# Patient Record
Sex: MALE | Race: BLACK OR AFRICAN AMERICAN | NOT HISPANIC OR LATINO
[De-identification: names, ages, dates, MRNs, and addresses within clinical notes are randomized per-mention and may not be internally consistent; named-entity substitution may affect disease eponyms.]

---

## 2018-01-01 ENCOUNTER — RESULT REVIEW (OUTPATIENT)
Age: 58
End: 2018-01-01

## 2018-01-01 ENCOUNTER — APPOINTMENT (OUTPATIENT)
Dept: HEMATOLOGY ONCOLOGY | Facility: CLINIC | Age: 58
End: 2018-01-01
Payer: COMMERCIAL

## 2018-01-01 ENCOUNTER — OTHER (OUTPATIENT)
Age: 58
End: 2018-01-01

## 2018-01-01 ENCOUNTER — APPOINTMENT (OUTPATIENT)
Dept: HEMATOLOGY ONCOLOGY | Facility: CLINIC | Age: 58
End: 2018-01-01

## 2018-01-01 ENCOUNTER — INBOUND DOCUMENT (OUTPATIENT)
Age: 58
End: 2018-01-01

## 2018-01-01 ENCOUNTER — TRANSCRIPTION ENCOUNTER (OUTPATIENT)
Age: 58
End: 2018-01-01

## 2018-01-01 VITALS
TEMPERATURE: 99.1 F | SYSTOLIC BLOOD PRESSURE: 124 MMHG | RESPIRATION RATE: 20 BRPM | OXYGEN SATURATION: 99 % | BODY MASS INDEX: 31.66 KG/M2 | HEART RATE: 139 BPM | DIASTOLIC BLOOD PRESSURE: 83 MMHG | WEIGHT: 248 LBS

## 2018-01-01 VITALS
HEART RATE: 132 BPM | RESPIRATION RATE: 16 BRPM | TEMPERATURE: 99 F | DIASTOLIC BLOOD PRESSURE: 71 MMHG | HEIGHT: 74.21 IN | BODY MASS INDEX: 30.16 KG/M2 | WEIGHT: 234.99 LBS | OXYGEN SATURATION: 99 % | SYSTOLIC BLOOD PRESSURE: 100 MMHG

## 2018-01-01 VITALS
HEART RATE: 130 BPM | SYSTOLIC BLOOD PRESSURE: 97 MMHG | HEIGHT: 74.21 IN | TEMPERATURE: 97.7 F | BODY MASS INDEX: 30.93 KG/M2 | RESPIRATION RATE: 20 BRPM | WEIGHT: 241 LBS | DIASTOLIC BLOOD PRESSURE: 69 MMHG | OXYGEN SATURATION: 98 %

## 2018-01-01 VITALS
HEIGHT: 73.43 IN | WEIGHT: 281 LBS | TEMPERATURE: 97.8 F | DIASTOLIC BLOOD PRESSURE: 80 MMHG | BODY MASS INDEX: 36.45 KG/M2 | HEART RATE: 99 BPM | SYSTOLIC BLOOD PRESSURE: 116 MMHG | RESPIRATION RATE: 20 BRPM | OXYGEN SATURATION: 99 %

## 2018-01-01 VITALS
HEART RATE: 124 BPM | BODY MASS INDEX: 32.85 KG/M2 | HEIGHT: 74.21 IN | RESPIRATION RATE: 22 BRPM | TEMPERATURE: 98.6 F | SYSTOLIC BLOOD PRESSURE: 106 MMHG | OXYGEN SATURATION: 100 % | WEIGHT: 256 LBS | DIASTOLIC BLOOD PRESSURE: 76 MMHG

## 2018-01-01 VITALS
SYSTOLIC BLOOD PRESSURE: 89 MMHG | WEIGHT: 239 LBS | HEART RATE: 133 BPM | DIASTOLIC BLOOD PRESSURE: 68 MMHG | TEMPERATURE: 97.4 F | RESPIRATION RATE: 22 BRPM | OXYGEN SATURATION: 99 % | BODY MASS INDEX: 30.51 KG/M2

## 2018-01-01 VITALS
BODY MASS INDEX: 34.91 KG/M2 | DIASTOLIC BLOOD PRESSURE: 74 MMHG | SYSTOLIC BLOOD PRESSURE: 108 MMHG | HEART RATE: 117 BPM | OXYGEN SATURATION: 100 % | TEMPERATURE: 97.9 F | WEIGHT: 272 LBS | HEIGHT: 74.21 IN | RESPIRATION RATE: 20 BRPM

## 2018-01-01 DIAGNOSIS — Z87.898 PERSONAL HISTORY OF OTHER SPECIFIED CONDITIONS: ICD-10-CM

## 2018-01-01 DIAGNOSIS — Z87.891 PERSONAL HISTORY OF NICOTINE DEPENDENCE: ICD-10-CM

## 2018-01-01 DIAGNOSIS — Z80.0 FAMILY HISTORY OF MALIGNANT NEOPLASM OF DIGESTIVE ORGANS: ICD-10-CM

## 2018-01-01 DIAGNOSIS — R68.83 CHILLS (WITHOUT FEVER): ICD-10-CM

## 2018-01-01 PROCEDURE — 99215 OFFICE O/P EST HI 40 MIN: CPT

## 2018-01-01 PROCEDURE — 99215 OFFICE O/P EST HI 40 MIN: CPT | Mod: Q5

## 2018-01-01 PROCEDURE — 99214 OFFICE O/P EST MOD 30 MIN: CPT

## 2018-01-01 RX ORDER — DRONABINOL 10 MG/1
10 CAPSULE ORAL TWICE DAILY
Qty: 60 | Refills: 0 | Status: DISCONTINUED | COMMUNITY
Start: 2018-01-01 | End: 2018-01-01

## 2018-01-01 RX ORDER — BINIMETINIB 15 MG/1
15 TABLET, FILM COATED ORAL
Qty: 180 | Refills: 10 | Status: ACTIVE | COMMUNITY
Start: 2018-01-01 | End: 1900-01-01

## 2018-01-01 RX ORDER — PANTOPRAZOLE 40 MG/1
40 TABLET, DELAYED RELEASE ORAL DAILY
Qty: 30 | Refills: 2 | Status: ACTIVE | COMMUNITY
Start: 2018-01-01 | End: 1900-01-01

## 2018-01-01 RX ORDER — CIPROFLOXACIN HYDROCHLORIDE 500 MG/1
500 TABLET, FILM COATED ORAL
Qty: 14 | Refills: 0 | Status: DISCONTINUED | COMMUNITY
Start: 2018-01-01 | End: 2018-01-01

## 2018-01-01 RX ORDER — MIRTAZAPINE 45 MG/1
45 TABLET, FILM COATED ORAL
Qty: 30 | Refills: 6 | Status: ACTIVE | COMMUNITY
Start: 2018-01-01 | End: 1900-01-01

## 2018-01-01 RX ORDER — LIDOCAINE AND PRILOCAINE 25; 25 MG/G; MG/G
2.5-2.5 CREAM TOPICAL
Qty: 1 | Refills: 6 | Status: ACTIVE | COMMUNITY
Start: 2018-01-01 | End: 1900-01-01

## 2018-01-01 RX ORDER — OXYCODONE HYDROCHLORIDE 5 MG/1
5 TABLET ORAL
Refills: 0 | Status: DISCONTINUED | COMMUNITY
End: 2018-01-01

## 2018-01-01 RX ORDER — MIRTAZAPINE 45 MG/1
45 TABLET, FILM COATED ORAL
Qty: 30 | Refills: 6 | Status: DISCONTINUED | COMMUNITY
Start: 2018-01-01 | End: 2018-01-01

## 2018-01-01 RX ORDER — ONDANSETRON 4 MG/1
4 TABLET, ORALLY DISINTEGRATING ORAL
Qty: 30 | Refills: 3 | Status: ACTIVE | COMMUNITY
Start: 2018-01-01 | End: 1900-01-01

## 2018-01-01 RX ORDER — LORATADINE 5 MG
17 TABLET,CHEWABLE ORAL
Refills: 0 | Status: ACTIVE | COMMUNITY

## 2018-01-01 RX ORDER — L.ACIDOPH/L.BULG/B.BIF/S.THERM 1B-250 MG
TABLET ORAL
Refills: 0 | Status: DISCONTINUED | COMMUNITY
End: 2018-01-01

## 2018-01-01 RX ORDER — DRONABINOL 5 MG/1
5 CAPSULE ORAL
Refills: 0 | Status: DISCONTINUED | COMMUNITY
End: 2018-01-01

## 2018-10-09 PROBLEM — Z00.00 ENCOUNTER FOR PREVENTIVE HEALTH EXAMINATION: Status: ACTIVE | Noted: 2018-01-01

## 2018-10-17 PROBLEM — Z80.0 FAMILY HISTORY OF PANCREATIC CANCER: Status: ACTIVE | Noted: 2018-01-01

## 2018-10-18 PROBLEM — Z87.891 FORMER SMOKER: Status: ACTIVE | Noted: 2018-01-01

## 2018-10-18 PROBLEM — Z87.898 HISTORY OF MORBID OBESITY: Status: RESOLVED | Noted: 2018-01-01 | Resolved: 2018-01-01

## 2018-11-05 NOTE — PHYSICAL EXAM
[Ambulatory and capable of all self care but unable to carry out any work activities] : Status 2- Ambulatory and capable of all self care but unable to carry out any work activities. Up and about more than 50% of waking hours [Obese] : obese [Normal] : full range of motion and no deformities appreciated

## 2018-11-12 NOTE — ASSESSMENT
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\par Aggressive high grade malignancy with extensive tissue necrosis\par WOrsening performance status, with no appetite and severe weight loss\par SIMRAN\par \par For C2D1 Cisplatin gemcitabine\par Remeron for depression and appetite\par Ritalin offered- he wants to continue with remeron for a while\par \par Plan:\par Cisplatin (25 mg/m2) + gemcitabine (1000 mg/m2) on days 1 and 8 every 21 days  \par \par Depression, failure to thrive\par On marinol\par Add remeron 45 mg QHS\par \par Family ho pancreatic cancer\par Father and 2 brothers apparently  of pancreatic cancer\par Genetic testing done - results pending\par \par Follow up in 1 week for C2D1 cis gem. CBC, CMP,

## 2018-11-12 NOTE — HISTORY OF PRESENT ILLNESS
[de-identified] : Mr Rios is a very pleasant 58 year old gentleman seen in the office to follow up regarding his new diagnosis of gall bladder adenosquamous carcinoma. \par \par He was seen and followed up during his recent inpatient hospitalizations at [facility]\par \par He presented 9/26/18 to [facility] with jaundice\par \par He reports trying to lose weight - had some detox juice few days ago. Since then his urine turned dark. His family and work place colleagues noticed that he had yellow eyes and asked him to be evaluated\par \par He reports losing 5-6 lbs over the past 2 weeks\par Does not have an appetite\par No abdominal pain\par \par Labs showed T Sudeep of 14 and CT showed Findings most compatible with an infiltrating gallbladder neoplasm with hepatic metastases and mildly enlarged portal lymph nodes.  Diffusely irregular gallbladder contour with mild wall thickening and surrounding infiltrative changes.  The possibility of superimposed contained perforation and/or acute cholecystitis is not excluded. Intrahepatic biliary dilatation which may be related to mass effect or possibly ductal invasion.  Loss of the fat plane between the gallbladder and the proximal transverse colon.  This segment of bowel appear somewhat thickened.  Local invasion cannot be excluded. Indeterminate bilateral renal lesions.\par \par He underwent EGD and ERCP (9/28/18) which showed Bile duct mass causing proximal biliary and intrahepatic obstruction s/p biopsy - not amenable to bilateral endoscopic stent placement due to complete obstruction of the left intrahepatic system \par Path was non diagnostic with necrotic tissue\par \par On the same day he underwent  internalization with bilateral covered stents and placed bilateral internal/external drains 8.5 FR by IR \par He subsequently developed fevers s/p antibiotics. He had drain care, antibiotics and pain control. \par He had a CEA of 3.7 and CA 19-9 of 306\par \par He accidentally pulled out his drain in his sleep and presented to [facility] again on 10/11/18 w/ bleeding at tube site - possibly from suture site\par Image guided biopsy by IR again showed necrotic tissue\par He underwent right external biliary catheter check and upsize to 10-Latvian. Patent CBD stents with extensive intraluminal blood clots and US-guided core biopsy of right liver lesion by IR\par \par Bleeding stopped and he was discharged pending pathology\par \par Pathology \par ADENOSQUAMOUS CARCINOMA, HIGH GRADE, WITH EXTENSIVE NECROSIS.\par TUMOR IMMUNOHISTOCHEMICAL STAINS:\par    POSITIVE: P40, CK 7 AND CK19.\par    NEGATIVE: CK20, HEPATOCYTE AND ARGINASE-1.\par COMMENT:  THIS IMMUNOPHENOTYPE SUPPORTS THE DIAGNOSIS OF ADENOSQUAMOUS CARCINOMA AND IS CONSISTENT WITH A PRIMARY PANCREATICOBILIARY TUMOR (INCLUDING GALLBLADDER).  [de-identified] : He is seen today for C2D1 cisplatin gemcitabine\par \par Feels better,\par has slightly improved appetite\par Has started trying to eat more. \par \par No pain\par \par Takes remeron daily\par \par Empties the biliary drain bag once to twice daily\par \par Weight: 281 lbs -> 272 lbs -> 268 lbs -> 256 lbs\par \par

## 2018-11-12 NOTE — CONSULT LETTER
[Dear  ___] : Dear  [unfilled], [Courtesy Letter:] : I had the pleasure of seeing your patient, [unfilled], in my office today. [Please see my note below.] : Please see my note below. [Consult Closing:] : Thank you very much for allowing me to participate in the care of this patient.  If you have any questions, please do not hesitate to contact me. [Sincerely,] : Sincerely, [FreeTextEntry3] : Jorge Ruiz MD, MPH\par Attending Physician\par Hematology Oncology\par [facility] [place]\par [facility]\par

## 2018-11-19 NOTE — CONSULT LETTER
[Dear  ___] : Dear  [unfilled], [Courtesy Letter:] : I had the pleasure of seeing your patient, [unfilled], in my office today. [Please see my note below.] : Please see my note below. [Consult Closing:] : Thank you very much for allowing me to participate in the care of this patient.  If you have any questions, please do not hesitate to contact me. [Sincerely,] : Sincerely, [FreeTextEntry3] : Jorge Ruiz MD, MPH\par Attending Physician\par Hematology Oncology\par [facility] [place]\par [facility]\par

## 2018-11-19 NOTE — ASSESSMENT
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\par Aggressive high grade malignancy with extensive tissue necrosis\par WOrsening performance status, with no appetite and severe weight loss\par SIMRAN\par \par For C2D8 Cisplatin gemcitabine\par Remeron for depression and appetite\par Ritalin offered- he wants to continue with remeron for a while\par \par Plan:\par Cisplatin (25 mg/m2) + gemcitabine (1000 mg/m2) on days 1 and 8 every 21 days  \par \par Depression, failure to thrive\par On marinol- increase to 10 mg BID\par Remeron 45 mg QHS\par \par Family ho pancreatic cancer\par Father and 2 brothers apparently  of pancreatic cancer\par Genetic testing done - results pending\par \par Horse voice\par Refer to ENT\par Start pantoprazole\par \par Follow up in 2 weeks for C3D1 cis gem. CBC, CMP,

## 2018-11-19 NOTE — HISTORY OF PRESENT ILLNESS
[de-identified] : Mr Rios is a very pleasant 58 year old gentleman seen in the office to follow up regarding his new diagnosis of gall bladder adenosquamous carcinoma. \par \par He was seen and followed up during his recent inpatient hospitalizations at [facility]\par \par He presented 9/26/18 to [facility] with jaundice\par \par He reports trying to lose weight - had some detox juice few days ago. Since then his urine turned dark. His family and work place colleagues noticed that he had yellow eyes and asked him to be evaluated\par \par He reports losing 5-6 lbs over the past 2 weeks\par Does not have an appetite\par No abdominal pain\par \par Labs showed T Sudeep of 14 and CT showed Findings most compatible with an infiltrating gallbladder neoplasm with hepatic metastases and mildly enlarged portal lymph nodes.  Diffusely irregular gallbladder contour with mild wall thickening and surrounding infiltrative changes.  The possibility of superimposed contained perforation and/or acute cholecystitis is not excluded. Intrahepatic biliary dilatation which may be related to mass effect or possibly ductal invasion.  Loss of the fat plane between the gallbladder and the proximal transverse colon.  This segment of bowel appear somewhat thickened.  Local invasion cannot be excluded. Indeterminate bilateral renal lesions.\par \par He underwent EGD and ERCP (9/28/18) which showed Bile duct mass causing proximal biliary and intrahepatic obstruction s/p biopsy - not amenable to bilateral endoscopic stent placement due to complete obstruction of the left intrahepatic system \par Path was non diagnostic with necrotic tissue\par \par On the same day he underwent  internalization with bilateral covered stents and placed bilateral internal/external drains 8.5 FR by IR \par He subsequently developed fevers s/p antibiotics. He had drain care, antibiotics and pain control. \par He had a CEA of 3.7 and CA 19-9 of 306\par \par He accidentally pulled out his drain in his sleep and presented to [facility] again on 10/11/18 w/ bleeding at tube site - possibly from suture site\par Image guided biopsy by IR again showed necrotic tissue\par He underwent right external biliary catheter check and upsize to 10-Canadian. Patent CBD stents with extensive intraluminal blood clots and US-guided core biopsy of right liver lesion by IR\par \par Bleeding stopped and he was discharged pending pathology\par \par Pathology \par ADENOSQUAMOUS CARCINOMA, HIGH GRADE, WITH EXTENSIVE NECROSIS.\par TUMOR IMMUNOHISTOCHEMICAL STAINS:\par    POSITIVE: P40, CK 7 AND CK19.\par    NEGATIVE: CK20, HEPATOCYTE AND ARGINASE-1.\par COMMENT:  THIS IMMUNOPHENOTYPE SUPPORTS THE DIAGNOSIS OF ADENOSQUAMOUS CARCINOMA AND IS CONSISTENT WITH A PRIMARY PANCREATICOBILIARY TUMOR (INCLUDING GALLBLADDER).  [de-identified] : He is seen today for C2D8 gemcitabine of the cisplatin gemcitabine regimen\par \par Feels better,\par Has slightly improved appetite\par Has started trying to eat more. \par \par No pain\par \par Takes remeron daily\par \par Empties the biliary drain bag once to twice daily\par \par Weight: 281 lbs -> 272 lbs -> 268 lbs -> 256 lbs ->248 lbs\par \par

## 2018-12-03 PROBLEM — R68.83 CHILLS: Status: ACTIVE | Noted: 2018-01-01

## 2018-12-03 NOTE — HISTORY OF PRESENT ILLNESS
[de-identified] : Mr Rios is a very pleasant 58 year old gentleman seen in the office to follow up regarding his new diagnosis of gall bladder adenosquamous carcinoma. \par \par He was seen and followed up during his recent inpatient hospitalizations at [facility]\par \par He presented 9/26/18 to [facility] with jaundice\par \par He reports trying to lose weight - had some detox juice few days ago. Since then his urine turned dark. His family and work place colleagues noticed that he had yellow eyes and asked him to be evaluated\par \par He reports losing 5-6 lbs over the past 2 weeks\par Does not have an appetite\par No abdominal pain\par \par Labs showed T Sudeep of 14 and CT showed Findings most compatible with an infiltrating gallbladder neoplasm with hepatic metastases and mildly enlarged portal lymph nodes.  Diffusely irregular gallbladder contour with mild wall thickening and surrounding infiltrative changes.  The possibility of superimposed contained perforation and/or acute cholecystitis is not excluded. Intrahepatic biliary dilatation which may be related to mass effect or possibly ductal invasion.  Loss of the fat plane between the gallbladder and the proximal transverse colon.  This segment of bowel appear somewhat thickened.  Local invasion cannot be excluded. Indeterminate bilateral renal lesions.\par \par He underwent EGD and ERCP (9/28/18) which showed Bile duct mass causing proximal biliary and intrahepatic obstruction s/p biopsy - not amenable to bilateral endoscopic stent placement due to complete obstruction of the left intrahepatic system \par Path was non diagnostic with necrotic tissue\par \par On the same day he underwent  internalization with bilateral covered stents and placed bilateral internal/external drains 8.5 FR by IR \par He subsequently developed fevers s/p antibiotics. He had drain care, antibiotics and pain control. \par He had a CEA of 3.7 and CA 19-9 of 306\par \par He accidentally pulled out his drain in his sleep and presented to [facility] again on 10/11/18 w/ bleeding at tube site - possibly from suture site\par Image guided biopsy by IR again showed necrotic tissue\par He underwent right external biliary catheter check and upsize to 10-Macedonian. Patent CBD stents with extensive intraluminal blood clots and US-guided core biopsy of right liver lesion by IR\par \par Bleeding stopped and he was discharged pending pathology\par \par Pathology \par ADENOSQUAMOUS CARCINOMA, HIGH GRADE, WITH EXTENSIVE NECROSIS.\par TUMOR IMMUNOHISTOCHEMICAL STAINS:\par    POSITIVE: P40, CK 7 AND CK19.\par    NEGATIVE: CK20, HEPATOCYTE AND ARGINASE-1.\par COMMENT:  THIS IMMUNOPHENOTYPE SUPPORTS THE DIAGNOSIS OF ADENOSQUAMOUS CARCINOMA AND IS CONSISTENT WITH A PRIMARY PANCREATICOBILIARY TUMOR (INCLUDING GALLBLADDER).  [de-identified] : He is seen today for C3D1 cisplatin gemcitabine regimen\par \par Status unchanged\par Per wife- he had some chills and sweats\par Has been eating small portions mutliple times\par \par No pain\par \par Takes remeron daily\par \par Empties the biliary drain bag once to twice daily. Drainage has been more foul smelling lately\par \par Also has chills, night sweats. They do not check his temp\par \par Took pantoprazole x 15 days without improvement in his voice\par \par \par Weight: 281 lbs -> 272 lbs -> 268 lbs -> 256 lbs ->248 lbs -> 239 lbs\par \par

## 2018-12-03 NOTE — ASSESSMENT
[FreeTextEntry1] : Hypotension, tachycardia\par Possible dehydration vs infection/sepsis\par Advised to go to ED, HE declines\par Plan to give 1L NS in the infusion center. 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 benefit with BRAF inhibitor as he continues to decline- Will try to arrange on a arabella basis if any clinical benefit\par Scan after C3\par \par Plan:\par Cisplatin (25 mg/m2) + gemcitabine (1000 mg/m2) on days 1 and 8 every 21 days  \par \par Depression, failure to thrive\par On marinol- increase to 10 mg BID\par Remeron 45 mg QHS\par \par Family ho pancreatic cancer\par Father and 2 brothers apparently  of pancreatic cancer\par Genetic testing done - VUS found- reports scanned\par Refer for genetic testing\par \par Horse voice\par Refer to ENT\par No benefit with pantoprazole\par \par Follow up in 1 week for C3D8 cis gem. CBC, CMP

## 2018-12-03 NOTE — CONSULT LETTER
[Dear  ___] : Dear  [unfilled], [Courtesy Letter:] : I had the pleasure of seeing your patient, [unfilled], in my office today. [Please see my note below.] : Please see my note below. [Consult Closing:] : Thank you very much for allowing me to participate in the care of this patient.  If you have any questions, please do not hesitate to contact me. [Sincerely,] : Sincerely, [FreeTextEntry3] : Jorge Ruiz MD, MPH\par Attending Physician\par Hematology Oncology\par [facility] [place]\[facility]\par

## 2018-12-10 NOTE — HISTORY OF PRESENT ILLNESS
[de-identified] : Mr Rios is a very pleasant 58 year old gentleman seen in the office to follow up regarding his new diagnosis of gall bladder adenosquamous carcinoma. \par \par He was seen and followed up during his recent inpatient hospitalizations at [facility]\par \par He presented 9/26/18 to [facility] with jaundice\par \par He reports trying to lose weight - had some detox juice few days ago. Since then his urine turned dark. His family and work place colleagues noticed that he had yellow eyes and asked him to be evaluated\par \par He reports losing 5-6 lbs over the past 2 weeks\par Does not have an appetite\par No abdominal pain\par \par Labs showed T Sudeep of 14 and CT showed Findings most compatible with an infiltrating gallbladder neoplasm with hepatic metastases and mildly enlarged portal lymph nodes.  Diffusely irregular gallbladder contour with mild wall thickening and surrounding infiltrative changes.  The possibility of superimposed contained perforation and/or acute cholecystitis is not excluded. Intrahepatic biliary dilatation which may be related to mass effect or possibly ductal invasion.  Loss of the fat plane between the gallbladder and the proximal transverse colon.  This segment of bowel appear somewhat thickened.  Local invasion cannot be excluded. Indeterminate bilateral renal lesions.\par \par He underwent EGD and ERCP (9/28/18) which showed Bile duct mass causing proximal biliary and intrahepatic obstruction s/p biopsy - not amenable to bilateral endoscopic stent placement due to complete obstruction of the left intrahepatic system \par Path was non diagnostic with necrotic tissue\par \par On the same day he underwent  internalization with bilateral covered stents and placed bilateral internal/external drains 8.5 FR by IR \par He subsequently developed fevers s/p antibiotics. He had drain care, antibiotics and pain control. \par He had a CEA of 3.7 and CA 19-9 of 306\par \par He accidentally pulled out his drain in his sleep and presented to [facility] again on 10/11/18 w/ bleeding at tube site - possibly from suture site\par Image guided biopsy by IR again showed necrotic tissue\par He underwent right external biliary catheter check and upsize to 10-[place] Leonean. Patent CBD stents with extensive intraluminal blood clots and US-guided core biopsy of right liver lesion by IR\par \par Bleeding stopped and he was discharged pending pathology\par \par Pathology \par ADENOSQUAMOUS CARCINOMA, HIGH GRADE, WITH EXTENSIVE NECROSIS.\par TUMOR IMMUNOHISTOCHEMICAL STAINS:\par    POSITIVE: P40, CK 7 AND CK19.\par    NEGATIVE: CK20, HEPATOCYTE AND ARGINASE-1.\par COMMENT:  THIS IMMUNOPHENOTYPE SUPPORTS THE DIAGNOSIS OF ADENOSQUAMOUS CARCINOMA AND IS CONSISTENT WITH A PRIMARY PANCREATICOBILIARY TUMOR (INCLUDING GALLBLADDER).  [de-identified] : He is seen today for C3D8 cisplatin gemcitabine regimen\par \par He reports feeling the same but not declining\par There are times when he feels good in between not so good times\par \par Has been eating small portions mutliple times\par \par No pain\par \par Takes remeron daily\par \par Empties the biliary drain bag 4-5 times\par \par Continues to have change in his voice. Has not seen ENT as yet\par \par Weight: 281 lbs -> 272 lbs -> 268 lbs -> 256 lbs ->248 lbs -> 239 lbs -> 241 lbs\par \par

## 2018-12-10 NOTE — CONSULT LETTER
[Dear  ___] : Dear  [unfilled], [Courtesy Letter:] : I had the pleasure of seeing your patient, [unfilled], in my office today. [Please see my note below.] : Please see my note below. [Consult Closing:] : Thank you very much for allowing me to participate in the care of this patient.  If you have any questions, please do not hesitate to contact me. [Sincerely,] : Sincerely, [FreeTextEntry3] : Jorge Ruiz MD, MPH\par Attending Physician\par Hematology Oncology\par [facility] [place]\par [facility]\par

## 2018-12-10 NOTE — ASSESSMENT
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 benefit with BRAF inhibitor as he continues to decline- Will try to arrange on a arabella basis if any clinical benefit\par Scan after C3\par \par Plan:\par Cisplatin (25 mg/m2) + gemcitabine (1000 mg/m2) on days 1 and 8 every 21 days  \par \par Depression, failure to thrive\par On marinol- increase to 10 mg BID\par Remeron 45 mg QHS\par \par Family ho pancreatic cancer\par Father and 2 brothers apparently  of pancreatic cancer\par Genetic testing done - VUS found- reports scanned\par Refer for genetic testing\par \par Horse voice\par Refer to ENT\par No benefit with pantoprazole\par \par Follow up in 2 weeks for C4D1 cis gem. CBC, CMP

## 2018-12-24 NOTE — ASSESSMENT
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 benefit with BRAF inhibitor - Will check with pharmacy if it was approved - likely rejected\par Scan after C3 show stable disease\par \par Plan:\par Cisplatin (25 mg/m2) + gemcitabine (1000 mg/m2) on days 1 and 8 every 21 days  \par \par Depression, failure to thrive\par On marinol- increase to 10 mg BID\par Remeron 45 mg QHS\par \par Tachycardia:\par Seen by cardiology during recent inpatient hospitalization\par Sinus tachy - advised to observe for now\par \par Family ho pancreatic cancer\par Father and 2 brothers apparently  of pancreatic cancer\par Genetic testing done - VUS found- reports scanned\par Follows with genetics\par \par Horse voice\par Per wife, his voice is baseline, gets worse after chemo (? mucositis)\par No benefit with pantoprazole\par \par Follow up in 1 weeks for C4D1 cis gem. CBC, CMP,

## 2018-12-24 NOTE — CONSULT LETTER
[Dear  ___] : Dear  [unfilled], [Courtesy Letter:] : I had the pleasure of seeing your patient, [unfilled], in my office today. [Please see my note below.] : Please see my note below. [Consult Closing:] : Thank you very much for allowing me to participate in the care of this patient.  If you have any questions, please do not hesitate to contact me. [Sincerely,] : Sincerely, [FreeTextEntry3] : Jorge Ruiz MD, MPH\par Attending Physician\par Hematology Oncology\par [facility] [place]\par [facility]\par

## 2018-12-24 NOTE — HISTORY OF PRESENT ILLNESS
[de-identified] : Mr Rios is a very pleasant 58 year old gentleman seen in the office to follow up regarding his new diagnosis of gall bladder adenosquamous carcinoma. \par \par He was seen and followed up during his recent inpatient hospitalizations at [facility]\par \par He presented 9/26/18 to [facility] with jaundice\par \par He reports trying to lose weight - had some detox juice few days ago. Since then his urine turned dark. His family and work place colleagues noticed that he had yellow eyes and asked him to be evaluated\par \par He reports losing 5-6 lbs over the past 2 weeks\par Does not have an appetite\par No abdominal pain\par \par Labs showed T Sudeep of 14 and CT showed Findings most compatible with an infiltrating gallbladder neoplasm with hepatic metastases and mildly enlarged portal lymph nodes.  Diffusely irregular gallbladder contour with mild wall thickening and surrounding infiltrative changes.  The possibility of superimposed contained perforation and/or acute cholecystitis is not excluded. Intrahepatic biliary dilatation which may be related to mass effect or possibly ductal invasion.  Loss of the fat plane between the gallbladder and the proximal transverse colon.  This segment of bowel appear somewhat thickened.  Local invasion cannot be excluded. Indeterminate bilateral renal lesions.\par \par He underwent EGD and ERCP (9/28/18) which showed Bile duct mass causing proximal biliary and intrahepatic obstruction s/p biopsy - not amenable to bilateral endoscopic stent placement due to complete obstruction of the left intrahepatic system \par Path was non diagnostic with necrotic tissue\par \par On the same day he underwent  internalization with bilateral covered stents and placed bilateral internal/external drains 8.5 FR by IR \par He subsequently developed fevers s/p antibiotics. He had drain care, antibiotics and pain control. \par He had a CEA of 3.7 and CA 19-9 of 306\par \par He accidentally pulled out his drain in his sleep and presented to [facility] again on 10/11/18 w/ bleeding at tube site - possibly from suture site\par Image guided biopsy by IR again showed necrotic tissue\par He underwent right external biliary catheter check and upsize to 10-[place]. Patent CBD stents with extensive intraluminal blood clots and US-guided core biopsy of right liver lesion by IR\par \par Bleeding stopped and he was discharged pending pathology\par \par Pathology \par ADENOSQUAMOUS CARCINOMA, HIGH GRADE, WITH EXTENSIVE NECROSIS.\par TUMOR IMMUNOHISTOCHEMICAL STAINS:\par    POSITIVE: P40, CK 7 AND CK19.\par    NEGATIVE: CK20, HEPATOCYTE AND ARGINASE-1.\par COMMENT:  THIS IMMUNOPHENOTYPE SUPPORTS THE DIAGNOSIS OF ADENOSQUAMOUS CARCINOMA AND IS CONSISTENT WITH A PRIMARY PANCREATICOBILIARY TUMOR (INCLUDING GALLBLADDER).  [de-identified] : He is seen today for C4D1 cisplatin gemcitabine regimen\par \par He was in the hospital with gram negative septicemia and dc on levaquin oral\par He still has ~5 more days of Abx\par Had a fever of 100.2F and jitteriness 2 days back\par None since\par \par Feels slightly better overall\par There are times when he feels good in between not so good times\par \par Has been eating small portions mutliple times\par \par No pain\par \par Takes remeron daily\par \par Empties the biliary drain bag 4-5 times\par \par Weight: 281 lbs -> 272 lbs -> 268 lbs -> 256 lbs ->248 lbs -> 239 lbs -> 241 lbs -> 248 lbs\par \par

## 2018-12-31 NOTE — RESULTS/DATA
[FreeTextEntry1] : 12/31/18:\par WBC 21.7\par HGB 12.3\par HCT 38.2\par ,000\par \par  199 (prev 183)\par

## 2018-12-31 NOTE — REVIEW OF SYSTEMS
[Fatigue] : fatigue [Recent Change In Weight] : ~T recent weight change [SOB on Exertion] : shortness of breath during exertion [Negative] : Allergic/Immunologic [Fever] : no fever [Eye Pain] : no eye pain [Vision Problems] : no vision problems [Dysphagia] : no dysphagia [Hoarseness] : no hoarseness [Mucosal Pain] : no mucosal pain [Chest Pain] : no chest pain [Lower Ext Edema] : no lower extremity edema [Shortness Of Breath] : no shortness of breath [Cough] : no cough [Constipation] : no constipation [Diarrhea] : no diarrhea [Dysuria] : no dysuria [Joint Pain] : no joint pain [Muscle Pain] : no muscle pain [Skin Rash] : no skin rash [Dizziness] : no dizziness [Anxiety] : no anxiety [Depression] : no depression [Hot Flashes] : no hot flashes [Easy Bleeding] : no tendency for easy bleeding [Easy Bruising] : no tendency for easy bruising [FreeTextEntry2] : 13 lb wt loss. [FreeTextEntry5] : tachycardia

## 2018-12-31 NOTE — ASSESSMENT
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 benefit with BRAF inhibitor - Will check with pharmacy if it was approved - likely rejected\par Scan after C3 show stable disease\par \par Plan:\par Cisplatin (25 mg/m2) + gemcitabine (1000 mg/m2) on days 1 and 8 every 21 days  \par \par Depression, failure to thrive\par fatigue secondary to liver involvement.\par Discontinued marinol secondary to sedation.\par Remeron 45 mg QHS\par \par Tachycardia:\par Seen by cardiology during recent inpatient hospitalization\par Sinus tachy - advised to observe for now\par \par Family ho pancreatic cancer\par Father and 2 brothers apparently  of pancreatic cancer\par 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 mucositis)\par No benefit with pantoprazole\par \par History of present illness, review of systems, physical exam and treatment plan reviewed with  covering forSanjeev. \par \par Follow up in 1 weeks for C4D8 cis gem. CBC, CMP,

## 2018-12-31 NOTE — HISTORY OF PRESENT ILLNESS
[Therapy: ___] : Therapy: [unfilled] [Cycle: ___] : Cycle: [unfilled] [Day: ___] : Day: [unfilled] [de-identified] : Mr Rios is a very pleasant 58 year old gentleman seen in the office to follow up regarding his new diagnosis of gall bladder adenosquamous carcinoma. \par \par He was seen and followed up during his recent inpatient hospitalizations at [facility]\par \par He presented 9/26/18 to [facility] with jaundice\par \par He reports trying to lose weight - had some detox juice few days ago. Since then his urine turned dark. His family and work place colleagues noticed that he had yellow eyes and asked him to be evaluated\par \par He reports losing 5-6 lbs over the past 2 weeks\par Does not have an appetite\par No abdominal pain\par \par Labs showed T Sudeep of 14 and CT showed Findings most compatible with an infiltrating gallbladder neoplasm with hepatic metastases and mildly enlarged portal lymph nodes.  Diffusely irregular gallbladder contour with mild wall thickening and surrounding infiltrative changes.  The possibility of superimposed contained perforation and/or acute cholecystitis is not excluded. Intrahepatic biliary dilatation which may be related to mass effect or possibly ductal invasion.  Loss of the fat plane between the gallbladder and the proximal transverse colon.  This segment of bowel appear somewhat thickened.  Local invasion cannot be excluded. Indeterminate bilateral renal lesions.\par \par He underwent EGD and ERCP (9/28/18) which showed Bile duct mass causing proximal biliary and intrahepatic obstruction s/p biopsy - not amenable to bilateral endoscopic stent placement due to complete obstruction of the left intrahepatic system \par Path was non diagnostic with necrotic tissue\par \par On the same day he underwent  internalization with bilateral covered stents and placed bilateral internal/external drains 8.5 FR by IR \par He subsequently developed fevers s/p antibiotics. He had drain care, antibiotics and pain control. \par He had a CEA of 3.7 and CA 19-9 of 306\par \par He accidentally pulled out his drain in his sleep and presented to [facility] again on 10/11/18 w/ bleeding at tube site - possibly from suture site\par Image guided biopsy by IR again showed necrotic tissue\par He underwent right external biliary catheter check and upsize to 10-[place]. Patent CBD stents with extensive intraluminal blood clots and US-guided core biopsy of right liver lesion by IR\par \par Bleeding stopped and he was discharged pending pathology\par \par Pathology \par ADENOSQUAMOUS CARCINOMA, HIGH GRADE, WITH EXTENSIVE NECROSIS.\par TUMOR IMMUNOHISTOCHEMICAL STAINS:\par    POSITIVE: P40, CK 7 AND CK19.\par    NEGATIVE: CK20, HEPATOCYTE AND ARGINASE-1.\par COMMENT:  THIS IMMUNOPHENOTYPE SUPPORTS THE DIAGNOSIS OF ADENOSQUAMOUS CARCINOMA AND IS CONSISTENT WITH A PRIMARY PANCREATICOBILIARY TUMOR (INCLUDING GALLBLADDER).  [FreeTextEntry1] : Cisplatin/Gemzar delayed c 4 day 1 [de-identified] : He is seen today for delayed C4D1 cisplatin gemcitabine regimen due to hospitalization for gram negative septicemia s/p levaquin oral\par He denies fever, but remains profoundly fatigued. he is eating,small frequent meals however, he continues to lose weight.    He discontinued Dronabinol secondary to intolerance of symptoms like fatigue.\par Empties the biliary drain bag 4-5 times\par \par Weight: 281 lbs -> 272 lbs -> 268 lbs -> 256 lbs ->248 lbs -> 239 lbs -> 241 lbs -> 248 lbs->235\par \par

## 2018-12-31 NOTE — PHYSICAL EXAM
[Ambulatory and capable of all self care but unable to carry out any work activities] : Status 2- Ambulatory and capable of all self care but unable to carry out any work activities. Up and about more than 50% of waking hours [Obese] : obese [Normal] : affect appropriate

## 2019-01-01 ENCOUNTER — RESULT REVIEW (OUTPATIENT)
Age: 59
End: 2019-01-01

## 2019-01-01 ENCOUNTER — APPOINTMENT (OUTPATIENT)
Dept: HEMATOLOGY ONCOLOGY | Facility: CLINIC | Age: 59
End: 2019-01-01
Payer: COMMERCIAL

## 2019-01-01 ENCOUNTER — APPOINTMENT (OUTPATIENT)
Dept: HEMATOLOGY ONCOLOGY | Facility: CLINIC | Age: 59
End: 2019-01-01

## 2019-01-01 ENCOUNTER — INBOUND DOCUMENT (OUTPATIENT)
Age: 59
End: 2019-01-01

## 2019-01-01 VITALS
HEART RATE: 149 BPM | BODY MASS INDEX: 29.77 KG/M2 | TEMPERATURE: 97.9 F | SYSTOLIC BLOOD PRESSURE: 77 MMHG | RESPIRATION RATE: 20 BRPM | WEIGHT: 232 LBS | OXYGEN SATURATION: 100 % | DIASTOLIC BLOOD PRESSURE: 56 MMHG | HEIGHT: 74.21 IN

## 2019-01-01 VITALS
DIASTOLIC BLOOD PRESSURE: 75 MMHG | SYSTOLIC BLOOD PRESSURE: 115 MMHG | WEIGHT: 251.99 LBS | TEMPERATURE: 97.6 F | HEART RATE: 129 BPM | RESPIRATION RATE: 20 BRPM | HEIGHT: 74.21 IN | OXYGEN SATURATION: 96 % | BODY MASS INDEX: 32.34 KG/M2

## 2019-01-01 VITALS
HEIGHT: 74.21 IN | RESPIRATION RATE: 20 BRPM | TEMPERATURE: 98.8 F | BODY MASS INDEX: 30.16 KG/M2 | OXYGEN SATURATION: 98 % | HEART RATE: 137 BPM | WEIGHT: 235 LBS | SYSTOLIC BLOOD PRESSURE: 107 MMHG | DIASTOLIC BLOOD PRESSURE: 74 MMHG

## 2019-01-01 VITALS
RESPIRATION RATE: 20 BRPM | WEIGHT: 233 LBS | TEMPERATURE: 98.3 F | DIASTOLIC BLOOD PRESSURE: 78 MMHG | HEART RATE: 127 BPM | BODY MASS INDEX: 29.9 KG/M2 | HEIGHT: 74.21 IN | OXYGEN SATURATION: 99 % | SYSTOLIC BLOOD PRESSURE: 111 MMHG

## 2019-01-01 VITALS
WEIGHT: 202 LBS | HEART RATE: 123 BPM | HEIGHT: 74.21 IN | BODY MASS INDEX: 25.93 KG/M2 | RESPIRATION RATE: 20 BRPM | TEMPERATURE: 97.5 F | SYSTOLIC BLOOD PRESSURE: 108 MMHG | OXYGEN SATURATION: 100 % | DIASTOLIC BLOOD PRESSURE: 77 MMHG

## 2019-01-01 VITALS
DIASTOLIC BLOOD PRESSURE: 68 MMHG | BODY MASS INDEX: 30.8 KG/M2 | SYSTOLIC BLOOD PRESSURE: 104 MMHG | HEIGHT: 74.21 IN | TEMPERATURE: 98.8 F | OXYGEN SATURATION: 100 % | WEIGHT: 240 LBS | RESPIRATION RATE: 22 BRPM | HEART RATE: 146 BPM

## 2019-01-01 VITALS
SYSTOLIC BLOOD PRESSURE: 121 MMHG | HEIGHT: 74.21 IN | TEMPERATURE: 98.8 F | RESPIRATION RATE: 20 BRPM | DIASTOLIC BLOOD PRESSURE: 81 MMHG | SYSTOLIC BLOOD PRESSURE: 88 MMHG | OXYGEN SATURATION: 100 % | HEART RATE: 119 BPM | OXYGEN SATURATION: 100 % | BODY MASS INDEX: 29.9 KG/M2 | TEMPERATURE: 98.9 F | RESPIRATION RATE: 22 BRPM | HEIGHT: 74.21 IN | DIASTOLIC BLOOD PRESSURE: 72 MMHG | WEIGHT: 232.98 LBS | HEART RATE: 127 BPM

## 2019-01-01 DIAGNOSIS — Z79.899 OTHER LONG TERM (CURRENT) DRUG THERAPY: ICD-10-CM

## 2019-01-01 DIAGNOSIS — Z85.05 PERSONAL HISTORY OF MALIGNANT NEOPLASM OF LIVER: ICD-10-CM

## 2019-01-01 DIAGNOSIS — R50.9 FEVER, UNSPECIFIED: ICD-10-CM

## 2019-01-01 DIAGNOSIS — R62.51 FAILURE TO THRIVE (CHILD): ICD-10-CM

## 2019-01-01 DIAGNOSIS — Z51.11 ENCOUNTER FOR ANTINEOPLASTIC CHEMOTHERAPY: ICD-10-CM

## 2019-01-01 DIAGNOSIS — Z15.09 GENETIC SUSCEPTIBILITY TO OTHER DISEASE: ICD-10-CM

## 2019-01-01 DIAGNOSIS — F32.9 MAJOR DEPRESSIVE DISORDER, SINGLE EPISODE, UNSPECIFIED: ICD-10-CM

## 2019-01-01 DIAGNOSIS — Z15.89 GENETIC SUSCEPTIBILITY TO OTHER DISEASE: ICD-10-CM

## 2019-01-01 DIAGNOSIS — R11.2 NAUSEA WITH VOMITING, UNSPECIFIED: ICD-10-CM

## 2019-01-01 DIAGNOSIS — C23 MALIGNANT NEOPLASM OF GALLBLADDER: ICD-10-CM

## 2019-01-01 DIAGNOSIS — R64 CACHEXIA: ICD-10-CM

## 2019-01-01 PROCEDURE — 99215 OFFICE O/P EST HI 40 MIN: CPT

## 2019-01-01 PROCEDURE — 99214 OFFICE O/P EST MOD 30 MIN: CPT

## 2019-01-01 RX ORDER — DEXAMETHASONE 4 MG/1
4 TABLET ORAL
Qty: 2 | Refills: 9 | Status: ACTIVE | COMMUNITY
Start: 2018-01-01 | End: 1900-01-01

## 2019-01-01 RX ORDER — ENCORAFENIB 50 MG/1
50 CAPSULE ORAL
Qty: 180 | Refills: 11 | Status: ACTIVE | COMMUNITY
Start: 2018-01-01 | End: 1900-01-01

## 2019-01-01 RX ORDER — METOCLOPRAMIDE 10 MG/1
10 TABLET ORAL EVERY 6 HOURS
Qty: 60 | Refills: 11 | Status: ACTIVE | COMMUNITY
Start: 2018-01-01 | End: 1900-01-01

## 2019-01-01 RX ORDER — ONDANSETRON 4 MG/1
4 TABLET, ORALLY DISINTEGRATING ORAL
Qty: 90 | Refills: 1 | Status: ACTIVE | COMMUNITY
Start: 2019-01-01 | End: 1900-01-01

## 2019-01-01 RX ORDER — AMOXICILLIN AND CLAVULANATE POTASSIUM 875; 125 MG/1; MG/1
875-125 TABLET, COATED ORAL
Qty: 14 | Refills: 0 | Status: DISCONTINUED | COMMUNITY
Start: 2019-01-01 | End: 2019-01-01

## 2019-01-01 RX ORDER — LEVOFLOXACIN 500 MG/1
500 TABLET, FILM COATED ORAL DAILY
Qty: 15 | Refills: 0 | Status: ACTIVE | COMMUNITY
Start: 2019-01-01 | End: 1900-01-01

## 2019-01-01 RX ORDER — DEXAMETHASONE 4 MG/1
4 TABLET ORAL
Qty: 10 | Refills: 5 | Status: ACTIVE | COMMUNITY
Start: 2019-01-01 | End: 1900-01-01

## 2019-01-07 PROBLEM — F32.9 DEPRESSION: Status: ACTIVE | Noted: 2018-01-01

## 2019-01-07 NOTE — ASSESSMENT
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 Appeal in process\par \par For C4D8 Cisplatin gemcitabine\par Remeron for depression and appetite\par Gets hydration on \par Rising  but clinically feels better\par \par Plan:\par Cisplatin (25 mg/m2) + gemcitabine (1000 mg/m2) on days 1 and 8 every 21 days  \par \par Depression, failure to thrive\par fatigue secondary to liver involvement.\par Discontinued marinol secondary to sedation.\par Remeron 45 mg QHS\par \par Tachycardia:\par Seen by cardiology during recent inpatient hospitalization\par Sinus tachy - advised to observe for now\par \par Family ho pancreatic cancer\par Father and 2 brothers apparently  of pancreatic cancer\par 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 mucositis)\par No benefit with pantoprazole\par \par Follow up in 2 weeks for C5D1 cis gem. CBC, CMP,

## 2019-01-07 NOTE — HISTORY OF PRESENT ILLNESS
[Therapy: ___] : Therapy: [unfilled] [Cycle: ___] : Cycle: [unfilled] [Day: ___] : Day: [unfilled] [de-identified] : Mr Rios is a very pleasant 58 year old gentleman seen in the office to follow up regarding his new diagnosis of gall bladder adenosquamous carcinoma. \par \par He was seen and followed up during his recent inpatient hospitalizations at [facility]\par \par He presented 9/26/18 to [facility] with jaundice\par \par He reports trying to lose weight - had some detox juice few days ago. Since then his urine turned dark. His family and work place colleagues noticed that he had yellow eyes and asked him to be evaluated\par \par He reports losing 5-6 lbs over the past 2 weeks\par Does not have an appetite\par No abdominal pain\par \par Labs showed T Sudeep of 14 and CT showed Findings most compatible with an infiltrating gallbladder neoplasm with hepatic metastases and mildly enlarged portal lymph nodes.  Diffusely irregular gallbladder contour with mild wall thickening and surrounding infiltrative changes.  The possibility of superimposed contained perforation and/or acute cholecystitis is not excluded. Intrahepatic biliary dilatation which may be related to mass effect or possibly ductal invasion.  Loss of the fat plane between the gallbladder and the proximal transverse colon.  This segment of bowel appear somewhat thickened.  Local invasion cannot be excluded. Indeterminate bilateral renal lesions.\par \par He underwent EGD and ERCP (9/28/18) which showed Bile duct mass causing proximal biliary and intrahepatic obstruction s/p biopsy - not amenable to bilateral endoscopic stent placement due to complete obstruction of the left intrahepatic system \par Path was non diagnostic with necrotic tissue\par \par On the same day he underwent  internalization with bilateral covered stents and placed bilateral internal/external drains 8.5 FR by IR \par He subsequently developed fevers s/p antibiotics. He had drain care, antibiotics and pain control. \par He had a CEA of 3.7 and CA 19-9 of 306\par \par He accidentally pulled out his drain in his sleep and presented to [facility] again on 10/11/18 w/ bleeding at tube site - possibly from suture site\par Image guided biopsy by IR again showed necrotic tissue\par He underwent right external biliary catheter check and upsize to 10-[place]. Patent CBD stents with extensive intraluminal blood clots and US-guided core biopsy of right liver lesion by IR\par \par Bleeding stopped and he was discharged pending pathology\par \par Pathology \par ADENOSQUAMOUS CARCINOMA, HIGH GRADE, WITH EXTENSIVE NECROSIS.\par TUMOR IMMUNOHISTOCHEMICAL STAINS:\par    POSITIVE: P40, CK 7 AND CK19.\par    NEGATIVE: CK20, HEPATOCYTE AND ARGINASE-1.\par COMMENT:  THIS IMMUNOPHENOTYPE SUPPORTS THE DIAGNOSIS OF ADENOSQUAMOUS CARCINOMA AND IS CONSISTENT WITH A PRIMARY PANCREATICOBILIARY TUMOR (INCLUDING GALLBLADDER).  [FreeTextEntry1] : Cisplatin/Gemzar delayed c 4 day 1 [de-identified] : He is seen today for C4D8 cisplatin gemcitabine regimen \par \par Has been eating better, going out  more- \par Complains of constipation  with magnesium. Advised to stop\par \par Weight: 281 lbs -> 272 lbs -> 268 lbs -> 256 lbs ->248 lbs -> 239 lbs -> 241 lbs -> 248 lbs->235 lbs -> 233 lbs\par \par

## 2019-01-07 NOTE — CONSULT LETTER
[Dear  ___] : Dear  [unfilled], [Courtesy Letter:] : I had the pleasure of seeing your patient, [unfilled], in my office today. [Please see my note below.] : Please see my note below. [Consult Closing:] : Thank you very much for allowing me to participate in the care of this patient.  If you have any questions, please do not hesitate to contact me. [Sincerely,] : Sincerely, [FreeTextEntry3] : Jorge Ruiz MD, MPH\par Attending Physician\par Hematology Oncology\par [facility] [place]\[facility]\par

## 2019-01-25 NOTE — ASSESSMENT
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 Appeal in process\par \par For C5D1 Cisplatin gemcitabine\par Remeron for depression and appetite\par Gets hydration on \par Fluctuating  but clinically feels better\par \par Plan:\par Cisplatin (25 mg/m2) + gemcitabine (1000 mg/m2) on days 1 and 8 every 21 days  \par \par Depression, failure to thrive\par fatigue secondary to liver involvement.\par Discontinued marinol secondary to sedation.\par Remeron 45 mg QHS\par \par Tachycardia:\par Seen by cardiology during recent inpatient hospitalization\par Sinus tachy - advised to observe for now\par \par Family ho pancreatic cancer\par Father and 2 brothers apparently  of pancreatic cancer\par 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 mucositis)\par No benefit with pantoprazole\par \par Follow up in 1 weeks for C5D8 cis gem. CBC, CMP,

## 2019-01-25 NOTE — HISTORY OF PRESENT ILLNESS
[de-identified] : Mr Rios is a very pleasant 58 year old gentleman seen in the office to follow up regarding his new diagnosis of gall bladder adenosquamous carcinoma. \par \par He was seen and followed up during his recent inpatient hospitalizations at [facility]\par \par He presented 9/26/18 to [facility] with jaundice\par \par He reports trying to lose weight - had some detox juice few days ago. Since then his urine turned dark. His family and work place colleagues noticed that he had yellow eyes and asked him to be evaluated\par \par He reports losing 5-6 lbs over the past 2 weeks\par Does not have an appetite\par No abdominal pain\par \par Labs showed T Sudeep of 14 and CT showed Findings most compatible with an infiltrating gallbladder neoplasm with hepatic metastases and mildly enlarged portal lymph nodes.  Diffusely irregular gallbladder contour with mild wall thickening and surrounding infiltrative changes.  The possibility of superimposed contained perforation and/or acute cholecystitis is not excluded. Intrahepatic biliary dilatation which may be related to mass effect or possibly ductal invasion.  Loss of the fat plane between the gallbladder and the proximal transverse colon.  This segment of bowel appear somewhat thickened.  Local invasion cannot be excluded. Indeterminate bilateral renal lesions.\par \par He underwent EGD and ERCP (9/28/18) which showed Bile duct mass causing proximal biliary and intrahepatic obstruction s/p biopsy - not amenable to bilateral endoscopic stent placement due to complete obstruction of the left intrahepatic system \par Path was non diagnostic with necrotic tissue\par \par On the same day he underwent  internalization with bilateral covered stents and placed bilateral internal/external drains 8.5 FR by IR \par He subsequently developed fevers s/p antibiotics. He had drain care, antibiotics and pain control. \par He had a CEA of 3.7 and CA 19-9 of 306\par \par He accidentally pulled out his drain in his sleep and presented to [facility] again on 10/11/18 w/ bleeding at tube site - possibly from suture site\par Image guided biopsy by IR again showed necrotic tissue\par He underwent right external biliary catheter check and upsize to 10-[place]. Patent CBD stents with extensive intraluminal blood clots and US-guided core biopsy of right liver lesion by IR\par \par Bleeding stopped and he was discharged pending pathology\par \par Pathology \par ADENOSQUAMOUS CARCINOMA, HIGH GRADE, WITH EXTENSIVE NECROSIS.\par TUMOR IMMUNOHISTOCHEMICAL STAINS:\par    POSITIVE: P40, CK 7 AND CK19.\par    NEGATIVE: CK20, HEPATOCYTE AND ARGINASE-1.\par COMMENT:  THIS IMMUNOPHENOTYPE SUPPORTS THE DIAGNOSIS OF ADENOSQUAMOUS CARCINOMA AND IS CONSISTENT WITH A PRIMARY PANCREATICOBILIARY TUMOR (INCLUDING GALLBLADDER).  [de-identified] : He is seen today for C5D1 cisplatin gemcitabine regimen \par \par Has been eating better, going out  more- \par s/p 1 unit PRBC few days back. Felt better\par \par Weight: 281 lbs -> 272 lbs -> 268 lbs -> 256 lbs ->248 lbs -> 239 lbs -> 241 lbs -> 248 lbs->235 lbs -> 233 lbs -> 235 lbs\par \par

## 2019-01-25 NOTE — REVIEW OF SYSTEMS
[Fever] : no fever [Fatigue] : fatigue [Recent Change In Weight] : ~T recent weight change [Eye Pain] : no eye pain [Vision Problems] : no vision problems [Dysphagia] : no dysphagia [Hoarseness] : no hoarseness [Mucosal Pain] : no mucosal pain [Chest Pain] : no chest pain [Lower Ext Edema] : no lower extremity edema [Shortness Of Breath] : no shortness of breath [Cough] : no cough [SOB on Exertion] : shortness of breath during exertion [Constipation] : no constipation [Diarrhea] : no diarrhea [Dysuria] : no dysuria [Joint Pain] : no joint pain [Muscle Pain] : no muscle pain [Skin Rash] : no skin rash [Dizziness] : no dizziness [Anxiety] : no anxiety [Depression] : no depression [Hot Flashes] : no hot flashes [Easy Bleeding] : no tendency for easy bleeding [Easy Bruising] : no tendency for easy bruising [Negative] : Allergic/Immunologic [FreeTextEntry2] : 13 lb wt loss. [FreeTextEntry5] : tachycardia

## 2019-01-25 NOTE — CONSULT LETTER
[Dear  ___] : Dear  [unfilled], [Courtesy Letter:] : I had the pleasure of seeing your patient, [unfilled], in my office today. [Please see my note below.] : Please see my note below. [Consult Closing:] : Thank you very much for allowing me to participate in the care of this patient.  If you have any questions, please do not hesitate to contact me. [Sincerely,] : Sincerely, [FreeTextEntry3] : Jorge Ruiz MD, MPH\par Attending Physician\par Hematology Oncology\par [facility] [place]\par [facility]\par

## 2019-02-01 NOTE — HISTORY OF PRESENT ILLNESS
[FreeTextEntry1] : Cisplatin/Gemzar delayed c 4 day 1 [de-identified] : Mr Rios is a very pleasant 58 year old gentleman seen in the office to follow up regarding his new diagnosis of gall bladder adenosquamous carcinoma. \par \par He was seen and followed up during his recent inpatient hospitalizations at [facility]\par \par He presented 9/26/18 to [facility] with jaundice\par \par He reports trying to lose weight - had some detox juice few days ago. Since then his urine turned dark. His family and work place colleagues noticed that he had yellow eyes and asked him to be evaluated\par \par He reports losing 5-6 lbs over the past 2 weeks\par Does not have an appetite\par No abdominal pain\par \par Labs showed T Sudeep of 14 and CT showed Findings most compatible with an infiltrating gallbladder neoplasm with hepatic metastases and mildly enlarged portal lymph nodes.  Diffusely irregular gallbladder contour with mild wall thickening and surrounding infiltrative changes.  The possibility of superimposed contained perforation and/or acute cholecystitis is not excluded. Intrahepatic biliary dilatation which may be related to mass effect or possibly ductal invasion.  Loss of the fat plane between the gallbladder and the proximal transverse colon.  This segment of bowel appear somewhat thickened.  Local invasion cannot be excluded. Indeterminate bilateral renal lesions.\par \par He underwent EGD and ERCP (9/28/18) which showed Bile duct mass causing proximal biliary and intrahepatic obstruction s/p biopsy - not amenable to bilateral endoscopic stent placement due to complete obstruction of the left intrahepatic system \par Path was non diagnostic with necrotic tissue\par \par On the same day he underwent  internalization with bilateral covered stents and placed bilateral internal/external drains 8.5 FR by IR \par He subsequently developed fevers s/p antibiotics. He had drain care, antibiotics and pain control. \par He had a CEA of 3.7 and CA 19-9 of 306\par \par He accidentally pulled out his drain in his sleep and presented to [facility] again on 10/11/18 w/ bleeding at tube site - possibly from suture site\par Image guided biopsy by IR again showed necrotic tissue\par He underwent right external biliary catheter check and upsize to 10-Dominican. Patent CBD stents with extensive intraluminal blood clots and US-guided core biopsy of right liver lesion by IR\par \par Bleeding stopped and he was discharged pending pathology\par \par Pathology \par ADENOSQUAMOUS CARCINOMA, HIGH GRADE, WITH EXTENSIVE NECROSIS.\par TUMOR IMMUNOHISTOCHEMICAL STAINS:\par    POSITIVE: P40, CK 7 AND CK19.\par    NEGATIVE: CK20, HEPATOCYTE AND ARGINASE-1.\par COMMENT:  THIS IMMUNOPHENOTYPE SUPPORTS THE DIAGNOSIS OF ADENOSQUAMOUS CARCINOMA AND IS CONSISTENT WITH A PRIMARY PANCREATICOBILIARY TUMOR (INCLUDING GALLBLADDER).  [de-identified] : He is seen today for C5D8 cisplatin gemcitabine regimen \par \par Has been eating better, going out  more\par He feels dizzy, BP 88/72, \par \par \par Weight: 281 lbs -> 272 lbs -> 268 lbs -> 256 lbs ->248 lbs -> 239 lbs -> 241 lbs -> 248 lbs->235 lbs -> 233 lbs -> 235 lbs\par \par

## 2019-02-01 NOTE — ASSESSMENT
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 mucositis)\par No benefit with pantoprazole\par \par Follow up in 1 week for tox check and 2 weeks for C6D1 cis gem. \par CBC, CMP,

## 2019-02-01 NOTE — CONSULT LETTER
[FreeTextEntry3] : Jorge Ruiz MD, MPH\par Attending Physician\par Hematology Oncology\par [facility] [place]\par [facility]\par

## 2019-02-01 NOTE — REVIEW OF SYSTEMS
HENT: Negative for congestion, rhinorrhea and sore throat. Eyes: Negative for photophobia and pain. Respiratory: Negative for cough and shortness of breath. Cardiovascular: Negative for chest pain and palpitations. Gastrointestinal: Positive for abdominal pain. Negative for diarrhea, nausea and vomiting. Genitourinary: Negative for dysuria and flank pain. Musculoskeletal: Negative for back pain. Skin: Negative for rash. Neurological: Negative for dizziness, light-headedness and headaches. Except as noted above the remainder of the review of systems was reviewed and negative. PAST MEDICAL HISTORY     Past Medical History:   Diagnosis Date    CAD (coronary artery disease) 1/7/2013    Carotid arterial disease 1/7/2013    DM (diabetes mellitus) 1/7/2013    S/P CABG x 5 1/7/2013     Past Surgical History:   Procedure Laterality Date    CORONARY ARTERY BYPASS GRAFT      NECK SURGERY      x2     Social History     Social History    Marital status:      Spouse name: N/A    Number of children: N/A    Years of education: N/A     Social History Main Topics    Smoking status: Current Every Day Smoker     Packs/day: 0.50     Types: Cigarettes    Smokeless tobacco: Never Used    Alcohol use Yes      Comment: 3 drinks in evening    Drug use: No    Sexual activity: Not on file     Other Topics Concern    Not on file     Social History Narrative    No narrative on file       SCREENINGS             PHYSICAL EXAM    (up to 7 for level 4, 8 or more for level 5)     ED Triage Vitals [08/20/18 0825]   BP Temp Temp Source Pulse Resp SpO2 Height Weight   116/74 97.7 °F (36.5 °C) Oral 99 13 96 % 5' 10\" (1.778 m) 159 lb (72.1 kg)       Physical Exam   Constitutional: He is oriented to person, place, and time. He appears well-developed and well-nourished. He is active. No distress. HENT:   Head: Normocephalic and atraumatic.    Mouth/Throat: Mucous membranes are normal.   Neck: Normal and agree.   History and Exam by me shows Chronic abdominal pain      Hai Hewitt DO  Attending Emergency Physician         Hai Hewitt DO  08/20/18 8562 [Fever] : no fever [Eye Pain] : no eye pain [Vision Problems] : no vision problems [Dysphagia] : no dysphagia [Hoarseness] : no hoarseness [Mucosal Pain] : no mucosal pain [Chest Pain] : no chest pain [Lower Ext Edema] : no lower extremity edema [Shortness Of Breath] : no shortness of breath [Cough] : no cough [Constipation] : no constipation [Diarrhea] : no diarrhea [Dysuria] : no dysuria [Joint Pain] : no joint pain [Muscle Pain] : no muscle pain [Skin Rash] : no skin rash [Dizziness] : no dizziness [Anxiety] : no anxiety [Depression] : no depression [Hot Flashes] : no hot flashes [Easy Bleeding] : no tendency for easy bleeding [Easy Bruising] : no tendency for easy bruising [FreeTextEntry2] : 13 lb wt loss. [FreeTextEntry5] : tachycardia

## 2019-02-08 PROBLEM — R50.9 FEVER: Status: ACTIVE | Noted: 2019-01-01

## 2019-02-08 PROBLEM — R62.51 FAILURE TO THRIVE (0-17): Status: ACTIVE | Noted: 2018-01-01

## 2019-02-08 NOTE — CONSULT LETTER
[Dear  ___] : Dear  [unfilled], [Courtesy Letter:] : I had the pleasure of seeing your patient, [unfilled], in my office today. [Please see my note below.] : Please see my note below. [Consult Closing:] : Thank you very much for allowing me to participate in the care of this patient.  If you have any questions, please do not hesitate to contact me. [Sincerely,] : Sincerely, [FreeTextEntry3] : Jorge Ruiz MD, MPH\par Attending Physician\par Hematology Oncology\par [facility] [place]\par [facility]\par

## 2019-02-08 NOTE — ASSESSMENT
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 - BRAF-I declined by insurance initially- now approved and being arranged for\par \par For follow up after C5D8 Cisplatin gemcitabine\par Remeron for depression and appetite - not helping with depression\par Dexamethasone 8 mg daily x 5 days\par Refer to Dr Garcia (palliative care) for medical marijuana\par Hydration PRN- trying to see if can be arranged at home with [facility]\par Fluctuating  but clinically feels better\par \par Plan:\par Cisplatin (25 mg/m2) + gemcitabine (1000 mg/m2) on days 1 and 8 every 21 days  \par \par Depression, failure to thrive\par fatigue secondary to liver involvement.\par Discontinued marinol secondary to sedation.\par Remeron 45 mg QHS\par \par Tachycardia:\par Seen by cardiology during recent inpatient hospitalization\par Sinus tachy - advised to observe for now\par \par Family ho pancreatic cancer\par Father and 2 brothers apparently  of pancreatic cancer\par 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 mucositis)\par No benefit with pantoprazole\par \par Follow up in 1 week for C6D1 cis gem. \par CBC, CMP,

## 2019-02-08 NOTE — HISTORY OF PRESENT ILLNESS
[de-identified] : Mr Rios is a very pleasant 58 year old gentleman seen in the office to follow up regarding his new diagnosis of gall bladder adenosquamous carcinoma. \par \par He was seen and followed up during his recent inpatient hospitalizations at [facility]\par \par He presented 9/26/18 to [facility] with jaundice\par \par He reports trying to lose weight - had some detox juice few days ago. Since then his urine turned dark. His family and work place colleagues noticed that he had yellow eyes and asked him to be evaluated\par \par He reports losing 5-6 lbs over the past 2 weeks\par Does not have an appetite\par No abdominal pain\par \par Labs showed T Sudeep of 14 and CT showed Findings most compatible with an infiltrating gallbladder neoplasm with hepatic metastases and mildly enlarged portal lymph nodes.  Diffusely irregular gallbladder contour with mild wall thickening and surrounding infiltrative changes.  The possibility of superimposed contained perforation and/or acute cholecystitis is not excluded. Intrahepatic biliary dilatation which may be related to mass effect or possibly ductal invasion.  Loss of the fat plane between the gallbladder and the proximal transverse colon.  This segment of bowel appear somewhat thickened.  Local invasion cannot be excluded. Indeterminate bilateral renal lesions.\par \par He underwent EGD and ERCP (9/28/18) which showed Bile duct mass causing proximal biliary and intrahepatic obstruction s/p biopsy - not amenable to bilateral endoscopic stent placement due to complete obstruction of the left intrahepatic system \par Path was non diagnostic with necrotic tissue\par \par On the same day he underwent  internalization with bilateral covered stents and placed bilateral internal/external drains 8.5 FR by IR \par He subsequently developed fevers s/p antibiotics. He had drain care, antibiotics and pain control. \par He had a CEA of 3.7 and CA 19-9 of 306\par \par He accidentally pulled out his drain in his sleep and presented to [facility] again on 10/11/18 w/ bleeding at tube site - possibly from suture site\par Image guided biopsy by IR again showed necrotic tissue\par He underwent right external biliary catheter check and upsize to 10-Faroese. Patent CBD stents with extensive intraluminal blood clots and US-guided core biopsy of right liver lesion by IR\par \par Bleeding stopped and he was discharged pending pathology\par \par Pathology \par ADENOSQUAMOUS CARCINOMA, HIGH GRADE, WITH EXTENSIVE NECROSIS.\par TUMOR IMMUNOHISTOCHEMICAL STAINS:\par    POSITIVE: P40, CK 7 AND CK19.\par    NEGATIVE: CK20, HEPATOCYTE AND ARGINASE-1.\par COMMENT:  THIS IMMUNOPHENOTYPE SUPPORTS THE DIAGNOSIS OF ADENOSQUAMOUS CARCINOMA AND IS CONSISTENT WITH A PRIMARY PANCREATICOBILIARY TUMOR (INCLUDING GALLBLADDER).  [de-identified] : He is seen today for follow up after C5D8 cisplatin gemcitabine regimen \par \par Feels tired and fatigued\par Also is depressed\par Appetite better and gained some weight\par \par Weight: 281 lbs -> 272 lbs -> 268 lbs -> 256 lbs ->248 lbs -> 239 lbs -> 241 lbs -> 248 lbs->235 lbs -> 233 lbs -> 235 lbs -> 240 lbs\par \par

## 2019-03-08 NOTE — REVIEW OF SYSTEMS
[Fever] : no fever [Eye Pain] : no eye pain [Vision Problems] : no vision problems [Dysphagia] : no dysphagia [Hoarseness] : no hoarseness [Mucosal Pain] : no mucosal pain [Chest Pain] : no chest pain [Lower Ext Edema] : no lower extremity edema [Shortness Of Breath] : no shortness of breath [Cough] : no cough [Constipation] : no constipation [Diarrhea] : no diarrhea [Dysuria] : no dysuria [Joint Pain] : no joint pain [Muscle Pain] : no muscle pain [Skin Rash] : no skin rash [Dizziness] : no dizziness [Anxiety] : no anxiety [Depression] : no depression [Hot Flashes] : no hot flashes [Easy Bleeding] : no tendency for easy bleeding [Easy Bruising] : no tendency for easy bruising [FreeTextEntry5] : tachycardia

## 2019-03-08 NOTE — HISTORY OF PRESENT ILLNESS
[de-identified] : Mr Rios is a very pleasant 58 year old gentleman seen in the office to follow up regarding his new diagnosis of gall bladder adenosquamous carcinoma. \par \par He was seen and followed up during his recent inpatient hospitalizations at [facility]\par \par He presented 9/26/18 to [facility] with jaundice\par \par He reports trying to lose weight - had some detox juice few days ago. Since then his urine turned dark. His family and work place colleagues noticed that he had yellow eyes and asked him to be evaluated\par \par He reports losing 5-6 lbs over the past 2 weeks\par Does not have an appetite\par No abdominal pain\par \par Labs showed T Sudeep of 14 and CT showed Findings most compatible with an infiltrating gallbladder neoplasm with hepatic metastases and mildly enlarged portal lymph nodes.  Diffusely irregular gallbladder contour with mild wall thickening and surrounding infiltrative changes.  The possibility of superimposed contained perforation and/or acute cholecystitis is not excluded. Intrahepatic biliary dilatation which may be related to mass effect or possibly ductal invasion.  Loss of the fat plane between the gallbladder and the proximal transverse colon.  This segment of bowel appear somewhat thickened.  Local invasion cannot be excluded. Indeterminate bilateral renal lesions.\par \par He underwent EGD and ERCP (9/28/18) which showed Bile duct mass causing proximal biliary and intrahepatic obstruction s/p biopsy - not amenable to bilateral endoscopic stent placement due to complete obstruction of the left intrahepatic system \par Path was non diagnostic with necrotic tissue\par \par On the same day he underwent  internalization with bilateral covered stents and placed bilateral internal/external drains 8.5 FR by IR \par He subsequently developed fevers s/p antibiotics. He had drain care, antibiotics and pain control. \par He had a CEA of 3.7 and CA 19-9 of 306\par \par He accidentally pulled out his drain in his sleep and presented to [facility] again on 10/11/18 w/ bleeding at tube site - possibly from suture site\par Image guided biopsy by IR again showed necrotic tissue\par He underwent right external biliary catheter check and upsize to 10-Moroccan. Patent CBD stents with extensive intraluminal blood clots and US-guided core biopsy of right liver lesion by IR\par \par Bleeding stopped and he was discharged pending pathology\par \par Pathology \par ADENOSQUAMOUS CARCINOMA, HIGH GRADE, WITH EXTENSIVE NECROSIS.\par TUMOR IMMUNOHISTOCHEMICAL STAINS:\par    POSITIVE: P40, CK 7 AND CK19.\par    NEGATIVE: CK20, HEPATOCYTE AND ARGINASE-1.\par COMMENT:  THIS IMMUNOPHENOTYPE SUPPORTS THE DIAGNOSIS OF ADENOSQUAMOUS CARCINOMA AND IS CONSISTENT WITH A PRIMARY PANCREATICOBILIARY TUMOR (INCLUDING GALLBLADDER).  [de-identified] : He is seen today for follow up \par \par He completed 5 cycles of cisplatin gemcitabine regimen - was hospitalized for severe sepsis due to E. 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 He had abdominal CT showing necrotic abscess. Interventional radiology consulted, and hepatic drain placed. Infectious disease consulted, and patient treated with Linezolid and Zosyn IV while inpatient. Repeat blood cultures negative.  \par He was switched to Braftovi and Mektovi. \par Electrolyte disturbances replaced during hospitalization. \par EWELINA treated with IV fluids. \par Patient discharged on course of Levaquin PO and Linezolid PO\par \par Now on encorafenib and binemetinib\par Feels much better\par Not as depressed, eating more\par \par \par Weight: 281 lbs -> 272 lbs -> 268 lbs -> 256 lbs ->248 lbs -> 239 lbs -> 241 lbs -> 248 lbs->235 lbs -> 233 lbs -> 235 lbs -> 240 lbs -> 252 lbs\par \par

## 2019-03-08 NOTE — CONSULT LETTER
[FreeTextEntry3] : Jorge Ruiz MD, MPH\par Attending Physician\par Hematology Oncology\par [facility]\par [facility]\par

## 2019-03-08 NOTE — ASSESSMENT
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 - BRAF-I declined by insurance initially- now approved and being arranged for\par \par Completed  C5 Cisplatin gemcitabine\par Now on braftovi and mektovi\par Feels better\par Hydration today\par Remeron for depression and appetite - not helping with depression\par Dexamethasone 8 mg daily x 5 days\par Refer to Dr Garcia (palliative care) for medical marijuana\par Hydration PRN- trying to see if can be arranged at home with [facility]\par Fluctuating  but clinically feels better\par \par Plan:\par Cisplatin (25 mg/m2) + gemcitabine (1000 mg/m2) on days 1 and 8 every 21 days  \par \par Depression, failure to thrive\par fatigue secondary to liver involvement.\par Discontinued marinol secondary to sedation.\par Remeron 45 mg QHS\par \par Tachycardia:\par Seen by cardiology during recent inpatient hospitalization\par Sinus tachy - advised to observe for now\par \par Family ho pancreatic cancer\par Father and 2 brothers apparently  of pancreatic cancer\par 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 mucositis)\par No benefit with pantoprazole\par \par Follow up in 2 weeks. \par CBC, CMP,

## 2019-03-22 PROBLEM — Z15.89 MONOALLELIC MUTATION OF BRAF GENE: Status: ACTIVE | Noted: 2018-01-01

## 2019-03-22 PROBLEM — R64 MALIGNANT CACHEXIA: Status: ACTIVE | Noted: 2018-01-01

## 2019-03-22 NOTE — HISTORY OF PRESENT ILLNESS
[de-identified] : Mr Rios is a very pleasant 58 year old gentleman seen in the office to follow up regarding his new diagnosis of gall bladder adenosquamous carcinoma. \par \par He was seen and followed up during his recent inpatient hospitalizations at [facility]\par \par He presented 9/26/18 to [facility] with jaundice\par \par He reports trying to lose weight - had some detox juice few days ago. Since then his urine turned dark. His family and work place colleagues noticed that he had yellow eyes and asked him to be evaluated\par \par He reports losing 5-6 lbs over the past 2 weeks\par Does not have an appetite\par No abdominal pain\par \par Labs showed T Sudeep of 14 and CT showed Findings most compatible with an infiltrating gallbladder neoplasm with hepatic metastases and mildly enlarged portal lymph nodes.  Diffusely irregular gallbladder contour with mild wall thickening and surrounding infiltrative changes.  The possibility of superimposed contained perforation and/or acute cholecystitis is not excluded. Intrahepatic biliary dilatation which may be related to mass effect or possibly ductal invasion.  Loss of the fat plane between the gallbladder and the proximal transverse colon.  This segment of bowel appear somewhat thickened.  Local invasion cannot be excluded. Indeterminate bilateral renal lesions.\par \par He underwent EGD and ERCP (9/28/18) which showed Bile duct mass causing proximal biliary and intrahepatic obstruction s/p biopsy - not amenable to bilateral endoscopic stent placement due to complete obstruction of the left intrahepatic system \par Path was non diagnostic with necrotic tissue\par \par On the same day he underwent  internalization with bilateral covered stents and placed bilateral internal/external drains 8.5 FR by IR \par He subsequently developed fevers s/p antibiotics. He had drain care, antibiotics and pain control. \par He had a CEA of 3.7 and CA 19-9 of 306\par \par He accidentally pulled out his drain in his sleep and presented to [facility] again on 10/11/18 w/ bleeding at tube site - possibly from suture site\par Image guided biopsy by IR again showed necrotic tissue\par He underwent right external biliary catheter check and upsize to 10-Ivorian. Patent CBD stents with extensive intraluminal blood clots and US-guided core biopsy of right liver lesion by IR\par \par Bleeding stopped and he was discharged pending pathology\par \par Pathology \par ADENOSQUAMOUS CARCINOMA, HIGH GRADE, WITH EXTENSIVE NECROSIS.\par TUMOR IMMUNOHISTOCHEMICAL STAINS:\par    POSITIVE: P40, CK 7 AND CK19.\par    NEGATIVE: CK20, HEPATOCYTE AND ARGINASE-1.\par COMMENT:  THIS IMMUNOPHENOTYPE SUPPORTS THE DIAGNOSIS OF ADENOSQUAMOUS CARCINOMA AND IS CONSISTENT WITH A PRIMARY PANCREATICOBILIARY TUMOR (INCLUDING GALLBLADDER). \par \par He completed 5 cycles of cisplatin gemcitabine regimen - was hospitalized for severe sepsis due to E. 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 He had abdominal CT showing necrotic abscess. Interventional radiology consulted, and hepatic drain placed. Infectious disease consulted, and patient treated with Linezolid and Zosyn IV while inpatient. Repeat blood cultures negative.  \par He was switched to Braftovi and Mektovi. \par Electrolyte disturbances replaced during hospitalization. \par EWELINA treated with IV fluids. \par Patient discharged on course of Levaquin PO and Linezolid PO\par \par Now on encorafenib and binemetinib\par Feels much better\par Not as depressed, eating more\par \par  [de-identified] : He is seen today for follow up \par On encorafenib, binimetinb\par \par Overall feels stable\par Has an appetite\par Lost weight since last visit but believes it was the fluid\par \par \par Weight: 281 lbs -> 272 lbs -> 268 lbs -> 256 lbs ->248 lbs -> 239 lbs -> 241 lbs -> 248 lbs->235 lbs -> 233 lbs -> 235 lbs -> 240 lbs -> 252 lbs -> 233 lbs\par \par

## 2019-03-22 NOTE — ASSESSMENT
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 - BRAF-I declined by insurance initially- now approved and being arranged for\par \par Completed  C5 Cisplatin gemcitabine\par Started Braftovi and Mektovi (19-present)\par Feels better\par Hydration today\par Remeron for depression and appetite - not helping with depression\par Dexamethasone 8 mg daily x 5 days\par Refer to Dr Garcia (palliative care) for medical marijuana\par Hydration PRN- trying to see if can be arranged at home with [facility]\par Fluctuating  but clinically feels better\par \par Depression, failure to thrive\par fatigue secondary to liver involvement.\par Discontinued marinol secondary to sedation.\par Remeron 45 mg QHS\par \par Tachycardia:\par Seen by cardiology during recent inpatient hospitalization\par Sinus tachy - advised to observe for now\par \par Family ho pancreatic cancer\par Father and 2 brothers apparently  of pancreatic cancer\par 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 mucositis)\par No benefit with pantoprazole\par \par Follow up in 2 weeks. CT prior to next appointment\par CBC, CMP,

## 2019-03-22 NOTE — REVIEW OF SYSTEMS
[Fatigue] : fatigue [Recent Change In Weight] : ~T recent weight change [SOB on Exertion] : shortness of breath during exertion [Negative] : Allergic/Immunologic [Fever] : no fever [Eye Pain] : no eye pain [Vision Problems] : no vision problems [Dysphagia] : no dysphagia [Hoarseness] : no hoarseness [Mucosal Pain] : no mucosal pain [Chest Pain] : no chest pain [Lower Ext Edema] : no lower extremity edema [Shortness Of Breath] : no shortness of breath [Cough] : no cough [Constipation] : no constipation [Diarrhea] : no diarrhea [Dysuria] : no dysuria [Joint Pain] : no joint pain [Muscle Pain] : no muscle pain [Skin Rash] : no skin rash [Dizziness] : no dizziness [Anxiety] : no anxiety [Depression] : no depression [Hot Flashes] : no hot flashes [Easy Bleeding] : no tendency for easy bleeding [Easy Bruising] : no tendency for easy bruising [FreeTextEntry5] : tachycardia

## 2019-03-22 NOTE — CONSULT LETTER
[Dear  ___] : Dear  [unfilled], [Courtesy Letter:] : I had the pleasure of seeing your patient, [unfilled], in my office today. [Please see my note below.] : Please see my note below. [Consult Closing:] : Thank you very much for allowing me to participate in the care of this patient.  If you have any questions, please do not hesitate to contact me. [Sincerely,] : Sincerely, [FreeTextEntry3] : Jorge Ruiz MD, MPH\par Attending Physician\par Hematology Oncology\par [facility] [place]\par [facility]\par

## 2019-04-08 PROBLEM — R11.2 NAUSEA AND VOMITING: Status: ACTIVE | Noted: 2019-01-01

## 2019-04-08 PROBLEM — Z85.05 H/O KNOWN METASTASIS TO LIVER: Status: ACTIVE | Noted: 2018-01-01

## 2019-04-08 NOTE — ASSESSMENT
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 mucositis)\par No benefit with pantoprazole\par \par Follow up in 4 weeks. \par CBC, CMP,

## 2019-04-08 NOTE — HISTORY OF PRESENT ILLNESS
[de-identified] : Mr Rios is a very pleasant 58 year old gentleman seen in the office to follow up regarding his new diagnosis of gall bladder adenosquamous carcinoma. \par \par He was seen and followed up during his recent inpatient hospitalizations at [facility]\par \par He presented 9/26/18 to [facility] with jaundice\par \par He reports trying to lose weight - had some detox juice few days ago. Since then his urine turned dark. His family and work place colleagues noticed that he had yellow eyes and asked him to be evaluated\par \par He reports losing 5-6 lbs over the past 2 weeks\par Does not have an appetite\par No abdominal pain\par \par Labs showed T Sudeep of 14 and CT showed Findings most compatible with an infiltrating gallbladder neoplasm with hepatic metastases and mildly enlarged portal lymph nodes.  Diffusely irregular gallbladder contour with mild wall thickening and surrounding infiltrative changes.  The possibility of superimposed contained perforation and/or acute cholecystitis is not excluded. Intrahepatic biliary dilatation which may be related to mass effect or possibly ductal invasion.  Loss of the fat plane between the gallbladder and the proximal transverse colon.  This segment of bowel appear somewhat thickened.  Local invasion cannot be excluded. Indeterminate bilateral renal lesions.\par \par He underwent EGD and ERCP (9/28/18) which showed Bile duct mass causing proximal biliary and intrahepatic obstruction s/p biopsy - not amenable to bilateral endoscopic stent placement due to complete obstruction of the left intrahepatic system \par Path was non diagnostic with necrotic tissue\par \par On the same day he underwent  internalization with bilateral covered stents and placed bilateral internal/external drains 8.5 FR by IR \par He subsequently developed fevers s/p antibiotics. He had drain care, antibiotics and pain control. \par He had a CEA of 3.7 and CA 19-9 of 306\par \par He accidentally pulled out his drain in his sleep and presented to [facility] again on 10/11/18 w/ bleeding at tube site - possibly from suture site\par Image guided biopsy by IR again showed necrotic tissue\par He underwent right external biliary catheter check and upsize to 10-Somali. Patent CBD stents with extensive intraluminal blood clots and US-guided core biopsy of right liver lesion by IR\par \par Bleeding stopped and he was discharged pending pathology\par \par Pathology \par ADENOSQUAMOUS CARCINOMA, HIGH GRADE, WITH EXTENSIVE NECROSIS.\par TUMOR IMMUNOHISTOCHEMICAL STAINS:\par    POSITIVE: P40, CK 7 AND CK19.\par    NEGATIVE: CK20, HEPATOCYTE AND ARGINASE-1.\par COMMENT:  THIS IMMUNOPHENOTYPE SUPPORTS THE DIAGNOSIS OF ADENOSQUAMOUS CARCINOMA AND IS CONSISTENT WITH A PRIMARY PANCREATICOBILIARY TUMOR (INCLUDING GALLBLADDER). \par \par He completed 5 cycles of cisplatin gemcitabine regimen - was hospitalized for severe sepsis due to E. 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 He had abdominal CT showing necrotic abscess. Interventional radiology consulted, and hepatic drain placed. Infectious disease consulted, and patient treated with Linezolid and Zosyn IV while inpatient. Repeat blood cultures negative.  \par He was switched to Braftovi and Mektovi. \par Electrolyte disturbances replaced during hospitalization. \par EWELINA treated with IV fluids. \par Patient discharged on course of Levaquin PO and Linezolid PO\par \par Now on encorafenib and binemetinib\par Feels much better\par Not as depressed, eating more\par \par  [de-identified] : He is seen today for follow up \par On encorafenib, binimetinb\par \par He does not feel well today\par Had fever few days back - T 101F\par Had a procedure with IR few day back\par \par BP 70s/50s - Pulse 149\par \par He has nausea, vomiting 2-3 times a day\par Takes zofran only as needed. Advised him to take Q6-8H standing\par \par CT C/A/P (4/5/19)\par  No significant interval change. No evidence of progressive metastatic disease.CT ABDOMEN AND PELVIS:Diffuse hepatic metastatic disease is again seen. A large necrotic met/gallbladder mass contains a drainage catheter from an anterior approach new in the interval and is not significantly changed. The overall pattern appears relatively stable as compared to 2/16/2019. There are dilated intrahepatic biliary ducts seen again similar to the prior study.  Indwelling biliary stent is apparent as well as an internal/external drainage catheter extending into the duodenum from a right lateral approach. The spleen is unremarkable. There is no change in the appearance of the pancreas.The kidneys are not significantly changed with bilateral renal cysts including a large left parapelvic cysts again noted. The adrenal glands appear unchanged grossly unremarkable.The abdominal aorta is normal caliber. No significant intra-abdominal lymphadenopathy is appreciated.The urinary bladder prostate seminal vesicles are unremarkable.Ascites is apparent seen dependently in the pelvis.The large bowel reveals thickening of the hepatic flexure which abuts a hepatic mass and may represent direct into this portion of the large bowel.  There is no evidence of obstructive change at this level noted at this time. The cecum appendix appear unremarkable. The ileocecal valve small bowel loops are essentially nonspecific in appearance.The stomach as can be evaluated is grossly unremarkable. The distal body/antrum approximating an adjacent hepatic lesion with loss of cleavage plane that may represent rectal extension into this portion of the stomach without obstruction.\par IMPRESSION:No significant interval change in the diffuse hepatic metastatic disease. A drainage catheter from an anterior approach is now seen into a large inferior liver mass. The adjacent hepatic flexure and adjacent portion of the distal stomach abut this mass with lack of cleavage plane suggesting direct extension\par \par Weight: 281 lbs -> 272 lbs -> 268 lbs -> 256 lbs ->248 lbs -> 239 lbs -> 241 lbs -> 248 lbs->235 lbs -> 233 lbs -> 235 lbs -> 240 lbs -> 252 lbs \par

## 2019-04-08 NOTE — CONSULT LETTER
[Dear  ___] : Dear  [unfilled], [Courtesy Letter:] : I had the pleasure of seeing your patient, [unfilled], in my office today. [Please see my note below.] : Please see my note below. [Consult Closing:] : Thank you very much for allowing me to participate in the care of this patient.  If you have any questions, please do not hesitate to contact me. [Sincerely,] : Sincerely, [FreeTextEntry3] : Jorge Ruiz MD, MPH\par Attending Physician\par Hematology Oncology\par [facility] [place]\par [facility]\par

## 2019-04-08 NOTE — REVIEW OF SYSTEMS
[Fatigue] : fatigue [Recent Change In Weight] : ~T recent weight change [SOB on Exertion] : shortness of breath during exertion [Negative] : Allergic/Immunologic [Fever] : no fever [Eye Pain] : no eye pain [Vision Problems] : no vision problems [Dysphagia] : no dysphagia [Hoarseness] : no hoarseness [Mucosal Pain] : no mucosal pain [Chest Pain] : no chest pain [Lower Ext Edema] : no lower extremity edema [Shortness Of Breath] : no shortness of breath [Cough] : no cough [Constipation] : no constipation [Diarrhea] : no diarrhea [Dysuria] : no dysuria [Joint Pain] : no joint pain [Muscle Pain] : no muscle pain [Skin Rash] : no skin rash [Dizziness] : no dizziness [Anxiety] : no anxiety [Depression] : no depression [Hot Flashes] : no hot flashes [Easy Bleeding] : no tendency for easy bleeding [Easy Bruising] : no tendency for easy bruising [FreeTextEntry5] : tachycardia [FreeTextEntry2] : 10 point review of systems negative except as outlined in HPI

## 2019-04-23 PROBLEM — C23 GALLBLADDER CANCER: Status: ACTIVE | Noted: 2018-01-01

## 2019-04-23 PROBLEM — Z51.11 ENCOUNTER FOR CHEMOTHERAPY MANAGEMENT: Status: ACTIVE | Noted: 2018-01-01

## 2019-04-23 PROBLEM — Z79.899 ON ANTINEOPLASTIC CHEMOTHERAPY: Status: ACTIVE | Noted: 2018-01-01

## 2019-04-23 NOTE — CONSULT LETTER
[Dear  ___] : Dear  [unfilled], [Courtesy Letter:] : I had the pleasure of seeing your patient, [unfilled], in my office today. [Please see my note below.] : Please see my note below. [Consult Closing:] : Thank you very much for allowing me to participate in the care of this patient.  If you have any questions, please do not hesitate to contact me. [Sincerely,] : Sincerely, [FreeTextEntry3] : Jorge Ruiz MD, MPH\par Attending Physician\par Hematology Oncology\par [facility]\par [facility]\par

## 2019-04-23 NOTE — ASSESSMENT
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 mucositis)\par No benefit with pantoprazole\par \par Follow up in 2 weeks\par CBC, CMP,

## 2019-04-23 NOTE — HISTORY OF PRESENT ILLNESS
[de-identified] : Mr Riso is a very pleasant 58 year old gentleman seen in the office to follow up regarding his new diagnosis of gall bladder adenosquamous carcinoma. \par \par He was seen and followed up during his recent inpatient hospitalizations at [facility]\par \par He presented 9/26/18 to [facility] with jaundice\par \par He reports trying to lose weight - had some detox juice few days ago. Since then his urine turned dark. His family and work place colleagues noticed that he had yellow eyes and asked him to be evaluated\par \par He reports losing 5-6 lbs over the past 2 weeks\par Does not have an appetite\par No abdominal pain\par \par Labs showed T Sudeep of 14 and CT showed Findings most compatible with an infiltrating gallbladder neoplasm with hepatic metastases and mildly enlarged portal lymph nodes.  Diffusely irregular gallbladder contour with mild wall thickening and surrounding infiltrative changes.  The possibility of superimposed contained perforation and/or acute cholecystitis is not excluded. Intrahepatic biliary dilatation which may be related to mass effect or possibly ductal invasion.  Loss of the fat plane between the gallbladder and the proximal transverse colon.  This segment of bowel appear somewhat thickened.  Local invasion cannot be excluded. Indeterminate bilateral renal lesions.\par \par He underwent EGD and ERCP (9/28/18) which showed Bile duct mass causing proximal biliary and intrahepatic obstruction s/p biopsy - not amenable to bilateral endoscopic stent placement due to complete obstruction of the left intrahepatic system \par Path was non diagnostic with necrotic tissue\par \par On the same day he underwent  internalization with bilateral covered stents and placed bilateral internal/external drains 8.5 FR by IR \par He subsequently developed fevers s/p antibiotics. He had drain care, antibiotics and pain control. \par He had a CEA of 3.7 and CA 19-9 of 306\par \par He accidentally pulled out his drain in his sleep and presented to [facility] again on 10/11/18 w/ bleeding at tube site - possibly from suture site\par Image guided biopsy by IR again showed necrotic tissue\par He underwent right external biliary catheter check and upsize to 10-[place]. Patent CBD stents with extensive intraluminal blood clots and US-guided core biopsy of right liver lesion by IR\par \par Bleeding stopped and he was discharged pending pathology\par \par Pathology \par ADENOSQUAMOUS CARCINOMA, HIGH GRADE, WITH EXTENSIVE NECROSIS.\par TUMOR IMMUNOHISTOCHEMICAL STAINS:\par    POSITIVE: P40, CK 7 AND CK19.\par    NEGATIVE: CK20, HEPATOCYTE AND ARGINASE-1.\par COMMENT:  THIS IMMUNOPHENOTYPE SUPPORTS THE DIAGNOSIS OF ADENOSQUAMOUS CARCINOMA AND IS CONSISTENT WITH A PRIMARY PANCREATICOBILIARY TUMOR (INCLUDING GALLBLADDER). \par \par He completed 5 cycles of cisplatin gemcitabine regimen - was hospitalized for severe sepsis due to E. 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 He had abdominal CT showing necrotic abscess. Interventional radiology consulted, and hepatic drain placed. Infectious disease consulted, and patient treated with Linezolid and Zosyn IV while inpatient. Repeat blood cultures negative.  \par He was switched to Braftovi and Mektovi. \par Electrolyte disturbances replaced during hospitalization. \par EWELINA treated with IV fluids. \par Patient discharged on course of Levaquin PO and Linezolid PO\par \par Now on encorafenib and binemetinib\par Feels much better\par Not as depressed, eating more\par \par CT C/A/P (4/5/19)\par  No significant interval change. No evidence of progressive metastatic disease.CT ABDOMEN AND PELVIS:Diffuse hepatic metastatic disease is again seen. A large necrotic met/gallbladder mass contains a drainage catheter from an anterior approach new in the interval and is not significantly changed. The overall pattern appears relatively stable as compared to 2/16/2019. There are dilated intrahepatic biliary ducts seen again similar to the prior study.  Indwelling biliary stent is apparent as well as an internal/external drainage catheter extending into the duodenum from a right lateral approach. The spleen is unremarkable. There is no change in the appearance of the pancreas.The kidneys are not significantly changed with bilateral renal cysts including a large left parapelvic cysts again noted. The adrenal glands appear unchanged grossly unremarkable.The abdominal aorta is normal caliber. No significant intra-abdominal lymphadenopathy is appreciated.The urinary bladder prostate seminal vesicles are unremarkable.Ascites is apparent seen dependently in the pelvis.The large bowel reveals thickening of the hepatic flexure which abuts a hepatic mass and may represent direct into this portion of the large bowel.  There is no evidence of obstructive change at this level noted at this time. The cecum appendix appear unremarkable. The ileocecal valve small bowel loops are essentially nonspecific in appearance.The stomach as can be evaluated is grossly unremarkable. The distal body/antrum approximating an adjacent hepatic lesion with loss of cleavage plane that may represent rectal extension into this portion of the stomach without obstruction.\par IMPRESSION:No significant interval change in the diffuse hepatic metastatic disease. A drainage catheter from an anterior approach is now seen into a large inferior liver mass. The adjacent hepatic flexure and adjacent portion of the distal stomach abut this mass with lack of cleavage plane suggesting direct extension\par \par  [de-identified] : He is seen today for follow up \par On encorafenib, binimetinb\par \par He continues to have poor appetite and nausea\par Has lost 30 lbs since past appointment\par \par Weight: 281 lbs -> 272 lbs -> 268 lbs -> 256 lbs ->248 lbs -> 239 lbs -> 241 lbs -> 248 lbs->235 lbs -> 233 lbs -> 235 lbs -> 240 lbs -> 252 lbs > 202 lbs\par